# Patient Record
Sex: FEMALE | Race: WHITE | NOT HISPANIC OR LATINO | URBAN - METROPOLITAN AREA
[De-identification: names, ages, dates, MRNs, and addresses within clinical notes are randomized per-mention and may not be internally consistent; named-entity substitution may affect disease eponyms.]

---

## 2017-09-17 ENCOUNTER — EMERGENCY (EMERGENCY)
Facility: HOSPITAL | Age: 22
LOS: 1 days | Discharge: HOME | End: 2017-09-17
Attending: EMERGENCY MEDICINE | Admitting: EMERGENCY MEDICINE
Payer: COMMERCIAL

## 2017-09-17 VITALS
DIASTOLIC BLOOD PRESSURE: 83 MMHG | HEART RATE: 90 BPM | OXYGEN SATURATION: 99 % | TEMPERATURE: 98 F | SYSTOLIC BLOOD PRESSURE: 129 MMHG | RESPIRATION RATE: 18 BRPM

## 2017-09-17 DIAGNOSIS — R41.82 ALTERED MENTAL STATUS, UNSPECIFIED: ICD-10-CM

## 2017-09-17 DIAGNOSIS — F10.120 ALCOHOL ABUSE WITH INTOXICATION, UNCOMPLICATED: ICD-10-CM

## 2017-09-17 PROCEDURE — 99284 EMERGENCY DEPT VISIT MOD MDM: CPT | Mod: 25

## 2017-09-17 RX ORDER — ONDANSETRON 8 MG/1
4 TABLET, FILM COATED ORAL ONCE
Qty: 0 | Refills: 0 | Status: COMPLETED | OUTPATIENT
Start: 2017-09-17 | End: 2017-09-17

## 2017-09-17 RX ADMIN — ONDANSETRON 4 MILLIGRAM(S): 8 TABLET, FILM COATED ORAL at 05:07

## 2017-09-17 NOTE — ED ADULT NURSE NOTE - OBJECTIVE STATEMENT
22y female, bibems, accompanied by friend for alcohol intoxication. pt ambulatory, complaining of nausea. given zofran ODT. pt admits to drinking tonight. no other complaints. denies falls.

## 2017-09-17 NOTE — ED ADULT NURSE NOTE - CHPI ED SYMPTOMS NEG
no chills/no confusion/no nausea/no pain/no weakness/no disorientation/no fever/no abdominal distension/no abdominal pain/no vomiting

## 2017-09-17 NOTE — ED PROVIDER NOTE - MEDICAL DECISION MAKING DETAILS
Patient here with apparent isolated EtOH intoxication and n/v. Gave PO Zofran. She feels much better and will go home with her more sober friend.

## 2017-09-17 NOTE — ED PROVIDER NOTE - OBJECTIVE STATEMENT
Patient was BIBE for public EtOH intoxication. Had n/v in a cab. Accompanied by a friend. Slight unsteady gait, not drowsy. No trauma or incontinence. No drugs.

## 2023-09-26 NOTE — ED ADULT NURSE NOTE - CAS TRG GEN SKIN COLOR

## 2025-03-07 NOTE — ED ADULT NURSE NOTE - NS ED NOTE ABUSE SUSPICION NEGLECT YN
LVM regarding MD's message, and to return call if further explanation is needed.   
Patient returned call, and was notified of MD's message. Patient verbalized understanding.   
Please call patient let her know that her labs are back and they look good other than indicating that she needs to supplement her thyroid and her magnesium.  Please remind her that she needs to be taking her levothyroxine for her thyroid every day and her magnesium twice daily as prescribed.  She should have another blood test obtained in the office to check her magnesium in about 2 weeks.  The thyroid does not get checked for 3-month so she can do that when she sees Dr. Olson sometime.  
No